# Patient Record
Sex: MALE | Race: WHITE
[De-identification: names, ages, dates, MRNs, and addresses within clinical notes are randomized per-mention and may not be internally consistent; named-entity substitution may affect disease eponyms.]

---

## 2018-02-09 ENCOUNTER — HOSPITAL ENCOUNTER (OUTPATIENT)
Dept: HOSPITAL 74 - FASU | Age: 43
Discharge: HOME | End: 2018-02-09
Attending: ORTHOPAEDIC SURGERY
Payer: COMMERCIAL

## 2018-02-09 VITALS — DIASTOLIC BLOOD PRESSURE: 56 MMHG | SYSTOLIC BLOOD PRESSURE: 118 MMHG | HEART RATE: 55 BPM

## 2018-02-09 VITALS — TEMPERATURE: 98 F

## 2018-02-09 VITALS — BODY MASS INDEX: 37.8 KG/M2

## 2018-02-09 DIAGNOSIS — S46.212A: Primary | ICD-10-CM
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
## 2018-02-09 PROCEDURE — 0LM40ZZ REATTACHMENT OF LEFT UPPER ARM TENDON, OPEN APPROACH: ICD-10-PCS | Performed by: ORTHOPAEDIC SURGERY

## 2018-02-09 RX ADMIN — HYDROMORPHONE HYDROCHLORIDE ONE MG: 1 INJECTION, SOLUTION INTRAMUSCULAR; INTRAVENOUS; SUBCUTANEOUS at 16:00

## 2018-02-09 RX ADMIN — HYDROMORPHONE HYDROCHLORIDE ONE MG: 1 INJECTION, SOLUTION INTRAMUSCULAR; INTRAVENOUS; SUBCUTANEOUS at 16:15

## 2018-02-11 NOTE — OP
DATE OF OPERATION:  02/09/2018

 

LOCATION:  [facility]

SURGEON:  Mauri Ellison MD

 

ASSISTANT:  EDGARD Shaffer

 

PREOPERATIVE DIAGNOSIS:  Left elbow distal biceps rupture.

 

POSTOPERATIVE DIAGNOSIS:  Left elbow distal biceps rupture.

 

PROCEDURE:  Left elbow open repair and reattachment of left distal biceps tendon.

 

FINDINGS:  Avulsed biceps tendon, distal insertion.

 

PROCEDURE IN DETAIL:  Informed consent was obtained.  He was taken to the operating

room where the left upper extremity was prepped and draped in a sterile fashion.  A

tourniquet was placed on the upper arm and inflated to 250 mmHg.  

A horizontal incision was made inferior to the joint line, approximately 3 cm. 

Careful attention was made to avoid neurovascular structures.  The fascial side was

the site.  The tendon was identified and released of scar tissue.  The biceps was

manually released allowing for retraction.

 

The pathway to the radial tuberosity was identified and local scar tissue was removed

off the radial neck and tuberosity.  Two No. 2 FiberWire sutures were placed into the

biceps tendon and secured to the _____ locking system.  A drill hole was placed

through the radial trochlea and neck for the interlocked toggle system.  It was put

into position after copious irrigation and reduction of tendon into the 7 mm drill

hole was performed.  This was found to have good stability and strength with bleeding

bone contact to the tendon.  

 

Wound irrigated again.  Tourniquet was released.  There was found to be no evidence

of arterial bleeding.  Layered closure with 2-0 Vicryl and 3-0 Prolene as performed. 

A sterile dressing was placed. The patient was transferred to recovery room without

complications.  

 

 

 

MAURI ELLISON M.D.

 

[id number]

DD: 02/11/2018 11:38

DT: 02/11/2018 13:40

Job #:  [id number]

## 2018-02-14 NOTE — PATH
Surgical Pathology Report



Patient Name:  TAYLOR DE LA FUENTE

Accession #:  

Med. Rec. #:  [id number]                                                        

   /Age/Gender:  1975 (Age: 42) / M

Account:  [id number]                                                          

             Location: [facility] AMBULATORY 

Taken:  2018

Received:  2018

Reported:  2018

Physicians:  Rey Hwang M.D.

  



Specimen(s) Received

A: LEFT BICEP TENDON 

B: LEFT ARM SKIN MASS 





Clinical History

Bicep tendon rupture







Final Diagnosis

A. RIGHT TENDON, LEFT, REPAIR:

FIBROCOLLAGENOUS TISSUE (CONSISTENT WITH TENDON) SHOWING HEMORRHAGE AND

GRANULATION TISSUE FORMATION.



B. SKIN, LEFT ARM, MASS, EXCISION:

SKIN SHOWING HEMANGIOMA.





***Electronically Signed***

Tiara Rosales M.D.





Gross Description

A. Received in formalin labeled "left bicep tendon," are 2 tan brown portions of

fibrous tissue measuring 1.2 x 0.8 x 0.6 cm and 2.3 x 0.9 x 0.6 cm, consistent

with portions of tendon. Representative sections are submitted in one cassette.



B. Received in formalin labeled "left arm skin mass," is a 0.7 x 0.5 x 0.4 cm

tan-gray, polypoid portion of skin. The base is inked blue and the specimen is

bisected. The specimen is entirely submitted in one cassette.

/2018